# Patient Record
Sex: MALE | Race: OTHER | Employment: FULL TIME | URBAN - METROPOLITAN AREA
[De-identification: names, ages, dates, MRNs, and addresses within clinical notes are randomized per-mention and may not be internally consistent; named-entity substitution may affect disease eponyms.]

---

## 2023-08-08 ENCOUNTER — TELEPHONE (OUTPATIENT)
Dept: GASTROENTEROLOGY | Facility: CLINIC | Age: 38
End: 2023-08-08

## 2023-08-08 ENCOUNTER — OFFICE VISIT (OUTPATIENT)
Dept: GASTROENTEROLOGY | Facility: CLINIC | Age: 38
End: 2023-08-08
Payer: COMMERCIAL

## 2023-08-08 ENCOUNTER — TELEPHONE (OUTPATIENT)
Age: 38
End: 2023-08-08

## 2023-08-08 VITALS
HEIGHT: 73 IN | WEIGHT: 215 LBS | BODY MASS INDEX: 28.49 KG/M2 | SYSTOLIC BLOOD PRESSURE: 116 MMHG | DIASTOLIC BLOOD PRESSURE: 70 MMHG | TEMPERATURE: 97.7 F

## 2023-08-08 DIAGNOSIS — K62.5 BRBPR (BRIGHT RED BLOOD PER RECTUM): Primary | ICD-10-CM

## 2023-08-08 PROCEDURE — 99243 OFF/OP CNSLTJ NEW/EST LOW 30: CPT | Performed by: PHYSICIAN ASSISTANT

## 2023-08-08 NOTE — PROGRESS NOTES
[facility] - Outpatient Consultation  [facility] 45 y.o. male MRN: [id number]  Encounter: [id number]          ASSESSMENT AND PLAN:      1. BRBPR (bright red blood per rectum)  Pt says for 10+ yrs, he gets intermittent BRB with wiping + in the toilet, usually not mixed in the stool. He says this occurs once every 6 months or so. He was told to undergo colonoscopy for this in [facility] but deferred. He says he would be more comfortable undergoing anoscopy as he does not want colonoscopy, but he was unsure if GI did this or if he had to see the surgery team instead. [facility] deferred.   -discussed that anoscopy can be done by the colon-rectal team; colon-rectal referral placed    ______________________________________________________________________    HPI:  Pt says for 10+ yrs, he gets BRB with wiping or in the toilet once every 6 months or so. He denies constipation, straining with BMs, diarrhea, family hx of colon cancer, unintentional weight loss, fevers, chills, night sweats, abdominal pain, n/v, heartburn, black BMs. Pt says he usually does not see blood mixed in his stool. REVIEW OF SYSTEMS:    CONSTITUTIONAL: Denies any fever, chills, rigors, and weight loss. HEENT: No earache or tinnitus. Denies hearing loss or visual disturbances. CARDIOVASCULAR: No chest pain or palpitations. RESPIRATORY: Denies any cough, hemoptysis, shortness of breath or dyspnea on exertion. GASTROINTESTINAL: As noted in the History of Present Illness. GENITOURINARY: No problems with urination. Denies any hematuria or dysuria. NEUROLOGIC: No dizziness or vertigo, denies headaches. MUSCULOSKELETAL: Denies any muscle or joint pain. SKIN: Denies skin rashes or itching. ENDOCRINE: Denies excessive thirst. Denies intolerance to heat or cold. PSYCHOSOCIAL: Denies depression or anxiety. Denies any recent memory loss. Historical Information   History reviewed.  No pertinent past medical history. History reviewed. No pertinent surgical history. Social History   Social History     Substance and Sexual Activity   Alcohol Use Never     Social History     Substance and Sexual Activity   Drug Use Never     Social History     Tobacco Use   Smoking Status Never   Smokeless Tobacco Never     History reviewed. No pertinent family history. Meds/Allergies     No current outpatient medications on file. No Known Allergies        Objective     Blood pressure 116/70, temperature 97.7 °F (36.5 °C), temperature source Tympanic, height 6' 1" (1.854 m), weight 97.5 kg (215 lb). Body mass index is 28.37 kg/m². PHYSICAL EXAM:      General Appearance:   Alert, cooperative, no distress   HEENT:   Normocephalic, atraumatic, anicteric.     Neck:  Supple, symmetrical, trachea midline   Lungs:   Clear to auscultation bilaterally; no rales, rhonchi or wheezing; respirations unlabored    Heart[de-identified]   Regular rate and rhythm; no murmur, rub, or gallop. Abdomen:   Soft, non-tender, non-distended; normal bowel sounds; no masses, no organomegaly    Genitalia:   Deferred    Rectal:   Deferred    Extremities:  No cyanosis, clubbing or edema    Pulses:  2+ and symmetric    Skin:  No jaundice, rashes, or lesions    Lymph nodes:  No palpable cervical lymphadenopathy        Lab Results:   No visits with results within 1 Day(s) from this visit. Latest known visit with results is:   No results found for any previous visit. Radiology Results:   No results found.

## 2023-08-08 NOTE — TELEPHONE ENCOUNTER
Patients GI provider: Markell Beatty    Number to return call: [phone]    Reason for call: El Rahman from pre encounters called regarding patients demographics. El Rahman stated she needs more information on pt.'s insurance in order to get a referral for today's visit. El Rahman can be reached at above number.     Scheduled procedure/appointment date if applicable: N/A

## 2023-08-08 NOTE — TELEPHONE ENCOUNTER
Left message for patient to call with the name of his PCP. We need to obtain a referral for his visit.

## 2023-08-09 ENCOUNTER — TELEPHONE (OUTPATIENT)
Dept: GASTROENTEROLOGY | Facility: CLINIC | Age: 38
End: 2023-08-09

## 2023-08-09 NOTE — TELEPHONE ENCOUNTER
Left message for patient to call with the name of his primary care provider. Told him we needed to obtain a referral for his visit yesterday.